# Patient Record
Sex: MALE
[De-identification: names, ages, dates, MRNs, and addresses within clinical notes are randomized per-mention and may not be internally consistent; named-entity substitution may affect disease eponyms.]

---

## 2020-02-21 PROBLEM — Z00.00 ENCOUNTER FOR PREVENTIVE HEALTH EXAMINATION: Status: ACTIVE | Noted: 2020-02-21

## 2020-03-05 ENCOUNTER — APPOINTMENT (OUTPATIENT)
Dept: SURGERY | Facility: CLINIC | Age: 25
End: 2020-03-05
Payer: MEDICAID

## 2020-03-05 VITALS
WEIGHT: 163.13 LBS | TEMPERATURE: 97.9 F | OXYGEN SATURATION: 99 % | DIASTOLIC BLOOD PRESSURE: 72 MMHG | SYSTOLIC BLOOD PRESSURE: 129 MMHG | HEART RATE: 101 BPM | HEIGHT: 71 IN | BODY MASS INDEX: 22.84 KG/M2

## 2020-03-05 PROCEDURE — 99202 OFFICE O/P NEW SF 15 MIN: CPT

## 2020-03-05 NOTE — CONSULT LETTER
[Dear  ___] : Dear  [unfilled], [Consult Letter:] : I had the pleasure of evaluating your patient, [unfilled]. [Please see my note below.] : Please see my note below. [Consult Closing:] : Thank you very much for allowing me to participate in the care of this patient.  If you have any questions, please do not hesitate to contact me. [FreeTextEntry3] : Sincerely, \par \par \par Bela Pacheco MD, FACS\par \par  of Surgery\par [facility]\par System Chief, Residency Program\par [facility] \par \par [employer] [facility]\par Co-Director of [facility] Clerkship\par [employer] [facility]\par

## 2020-03-05 NOTE — PLAN
[FreeTextEntry1] : 23 yo male with umbilical hernia. \par -Due to asymptomatic nature, we will observe the hernia. \par -He was explained of the signs and symptoms of incarceration. We will continue observe and see him back in six months to determine if it has gotten more symptomatic or larger.  \par -All questions were answered in detail and the patient expressed full understanding.

## 2020-03-05 NOTE — HISTORY OF PRESENT ILLNESS
[de-identified] : 25 yo male who presents to the office for evaluation of umbilical hernia. He denied any obstructive symptoms associated with it.

## 2020-03-05 NOTE — PHYSICAL EXAM
[JVD] : no jugular venous distention  [Normal Thyroid] : the thyroid was normal [Normal Breath Sounds] : Normal breath sounds [Normal Heart Sounds] : normal heart sounds [Normal Rate and Rhythm] : normal rate and rhythm [Abdominal Masses] : No abdominal masses [Abdomen Tenderness] : ~T ~M No abdominal tenderness [Tender] : was nontender [Enlarged] : not enlarged [No Rash or Lesion] : No rash or lesion [Alert] : alert [Oriented to Person] : oriented to person [Oriented to Place] : oriented to place [Oriented to Time] : oriented to time [Calm] : calm [de-identified] : alert and oriented x3 [de-identified] : normal [de-identified] : umbilical hernia, reducible  [de-identified] : no discernible hernia

## 2021-12-09 ENCOUNTER — TRANSCRIPTION ENCOUNTER (OUTPATIENT)
Age: 26
End: 2021-12-09